# Patient Record
Sex: MALE | Race: OTHER | HISPANIC OR LATINO
[De-identification: names, ages, dates, MRNs, and addresses within clinical notes are randomized per-mention and may not be internally consistent; named-entity substitution may affect disease eponyms.]

---

## 2018-09-19 PROBLEM — Z00.00 ENCOUNTER FOR PREVENTIVE HEALTH EXAMINATION: Status: ACTIVE | Noted: 2018-09-19

## 2018-09-25 PROBLEM — Z01.818 PREOPERATIVE EXAMINATION: Status: ACTIVE | Noted: 2018-09-25

## 2018-09-28 ENCOUNTER — APPOINTMENT (OUTPATIENT)
Dept: SURGERY | Facility: CLINIC | Age: 28
End: 2018-09-28

## 2018-09-28 DIAGNOSIS — Z01.818 ENCOUNTER FOR OTHER PREPROCEDURAL EXAMINATION: ICD-10-CM

## 2018-10-15 ENCOUNTER — OTHER (OUTPATIENT)
Age: 28
End: 2018-10-15

## 2018-10-15 ENCOUNTER — APPOINTMENT (OUTPATIENT)
Dept: SURGERY | Facility: CLINIC | Age: 28
End: 2018-10-15

## 2019-01-16 ENCOUNTER — TRANSCRIPTION ENCOUNTER (OUTPATIENT)
Age: 29
End: 2019-01-16

## 2019-01-16 ENCOUNTER — APPOINTMENT (OUTPATIENT)
Dept: BARIATRICS | Facility: CLINIC | Age: 29
End: 2019-01-16
Payer: COMMERCIAL

## 2019-01-16 VITALS
TEMPERATURE: 96.7 F | BODY MASS INDEX: 42.81 KG/M2 | HEART RATE: 85 BPM | DIASTOLIC BLOOD PRESSURE: 84 MMHG | HEIGHT: 67.5 IN | SYSTOLIC BLOOD PRESSURE: 136 MMHG | OXYGEN SATURATION: 98 % | WEIGHT: 276 LBS

## 2019-01-16 DIAGNOSIS — Z78.9 OTHER SPECIFIED HEALTH STATUS: ICD-10-CM

## 2019-01-16 DIAGNOSIS — R53.83 OTHER FATIGUE: ICD-10-CM

## 2019-01-16 DIAGNOSIS — Z86.39 PERSONAL HISTORY OF OTHER ENDOCRINE, NUTRITIONAL AND METABOLIC DISEASE: ICD-10-CM

## 2019-01-16 DIAGNOSIS — R12 HEARTBURN: ICD-10-CM

## 2019-01-16 PROCEDURE — 99203 OFFICE O/P NEW LOW 30 MIN: CPT

## 2019-01-16 NOTE — PHYSICAL EXAM
[Obese, well nourished, in no acute distress] : obese, well nourished, in no acute distress [Normal] : affect appropriate [de-identified] : large neck size [de-identified] : central obesity

## 2019-01-16 NOTE — HISTORY OF PRESENT ILLNESS
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

## 2019-01-16 NOTE — ASSESSMENT
[FreeTextEntry1] : for lap vsg \par nothing to be gained by further insurance mandated diet\par There is nothing to be gained by waitng more time \par all questions answered \par over 30 minutes\par

## 2019-02-07 ENCOUNTER — APPOINTMENT (OUTPATIENT)
Dept: BARIATRICS | Facility: CLINIC | Age: 29
End: 2019-02-07
Payer: COMMERCIAL

## 2019-02-07 VITALS — HEIGHT: 67.5 IN | BODY MASS INDEX: 42.9 KG/M2 | WEIGHT: 276.56 LBS

## 2019-02-07 PROCEDURE — 97802 MEDICAL NUTRITION INDIV IN: CPT

## 2019-02-28 ENCOUNTER — TRANSCRIPTION ENCOUNTER (OUTPATIENT)
Age: 29
End: 2019-02-28

## 2019-03-19 ENCOUNTER — FORM ENCOUNTER (OUTPATIENT)
Age: 29
End: 2019-03-19

## 2019-03-20 ENCOUNTER — APPOINTMENT (OUTPATIENT)
Dept: BARIATRICS | Facility: CLINIC | Age: 29
End: 2019-03-20

## 2019-03-20 ENCOUNTER — OUTPATIENT (OUTPATIENT)
Dept: OUTPATIENT SERVICES | Facility: HOSPITAL | Age: 29
LOS: 1 days | End: 2019-03-20
Payer: COMMERCIAL

## 2019-03-20 ENCOUNTER — APPOINTMENT (OUTPATIENT)
Dept: BARIATRICS | Facility: CLINIC | Age: 29
End: 2019-03-20
Payer: COMMERCIAL

## 2019-03-20 VITALS
BODY MASS INDEX: 42.41 KG/M2 | HEART RATE: 87 BPM | SYSTOLIC BLOOD PRESSURE: 137 MMHG | OXYGEN SATURATION: 98 % | WEIGHT: 273.38 LBS | HEIGHT: 67.5 IN | DIASTOLIC BLOOD PRESSURE: 83 MMHG

## 2019-03-20 DIAGNOSIS — E66.01 MORBID (SEVERE) OBESITY DUE TO EXCESS CALORIES: ICD-10-CM

## 2019-03-20 PROCEDURE — 71046 X-RAY EXAM CHEST 2 VIEWS: CPT | Mod: 26

## 2019-03-20 PROCEDURE — 99213 OFFICE O/P EST LOW 20 MIN: CPT

## 2019-03-20 PROCEDURE — 71046 X-RAY EXAM CHEST 2 VIEWS: CPT

## 2019-03-20 NOTE — PHYSICAL EXAM
[Obese, well nourished, in no acute distress] : obese, well nourished, in no acute distress [Normal] : affect appropriate [de-identified] : central obesity [de-identified] : large neck size

## 2019-03-20 NOTE — HISTORY OF PRESENT ILLNESS
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

## 2019-03-21 ENCOUNTER — OUTPATIENT (OUTPATIENT)
Dept: OUTPATIENT SERVICES | Facility: HOSPITAL | Age: 29
LOS: 1 days | End: 2019-03-21
Payer: COMMERCIAL

## 2019-03-21 DIAGNOSIS — Z01.818 ENCOUNTER FOR OTHER PREPROCEDURAL EXAMINATION: ICD-10-CM

## 2019-03-21 PROCEDURE — 93005 ELECTROCARDIOGRAM TRACING: CPT

## 2019-03-21 PROCEDURE — 93010 ELECTROCARDIOGRAM REPORT: CPT | Mod: NC

## 2019-03-22 VITALS
HEIGHT: 68 IN | SYSTOLIC BLOOD PRESSURE: 122 MMHG | WEIGHT: 272.05 LBS | DIASTOLIC BLOOD PRESSURE: 58 MMHG | TEMPERATURE: 97 F | HEART RATE: 79 BPM | RESPIRATION RATE: 18 BRPM | OXYGEN SATURATION: 96 %

## 2019-03-25 ENCOUNTER — APPOINTMENT (OUTPATIENT)
Dept: BARIATRICS | Facility: HOSPITAL | Age: 29
End: 2019-03-25

## 2019-03-25 ENCOUNTER — RESULT REVIEW (OUTPATIENT)
Age: 29
End: 2019-03-25

## 2019-03-25 ENCOUNTER — INPATIENT (INPATIENT)
Facility: HOSPITAL | Age: 29
LOS: 0 days | Discharge: HOME | DRG: 621 | End: 2019-03-26
Attending: SURGERY | Admitting: SURGERY
Payer: COMMERCIAL

## 2019-03-25 DIAGNOSIS — Z98.890 OTHER SPECIFIED POSTPROCEDURAL STATES: Chronic | ICD-10-CM

## 2019-03-25 LAB
HCT VFR BLD CALC: 43.9 % — SIGNIFICANT CHANGE UP (ref 39–50)
HGB BLD-MCNC: 14.5 G/DL — SIGNIFICANT CHANGE UP (ref 13–17)
MCHC RBC-ENTMCNC: 31.3 PG — SIGNIFICANT CHANGE UP (ref 27–34)
MCHC RBC-ENTMCNC: 33 GM/DL — SIGNIFICANT CHANGE UP (ref 32–36)
MCV RBC AUTO: 94.6 FL — SIGNIFICANT CHANGE UP (ref 80–100)
NRBC # BLD: 0 /100 WBCS — SIGNIFICANT CHANGE UP (ref 0–0)
PLATELET # BLD AUTO: 216 K/UL — SIGNIFICANT CHANGE UP (ref 150–400)
RBC # BLD: 4.64 M/UL — SIGNIFICANT CHANGE UP (ref 4.2–5.8)
RBC # FLD: 12.9 % — SIGNIFICANT CHANGE UP (ref 10.3–14.5)
WBC # BLD: 11.06 K/UL — HIGH (ref 3.8–10.5)
WBC # FLD AUTO: 11.06 K/UL — HIGH (ref 3.8–10.5)

## 2019-03-25 PROCEDURE — 43775 LAP SLEEVE GASTRECTOMY: CPT

## 2019-03-25 RX ORDER — HYDROMORPHONE HYDROCHLORIDE 2 MG/ML
0.5 INJECTION INTRAMUSCULAR; INTRAVENOUS; SUBCUTANEOUS EVERY 4 HOURS
Qty: 0 | Refills: 0 | Status: DISCONTINUED | OUTPATIENT
Start: 2019-03-25 | End: 2019-03-26

## 2019-03-25 RX ORDER — OXYCODONE HYDROCHLORIDE 5 MG/1
5 TABLET ORAL EVERY 4 HOURS
Qty: 0 | Refills: 0 | Status: DISCONTINUED | OUTPATIENT
Start: 2019-03-25 | End: 2019-03-25

## 2019-03-25 RX ORDER — HYDROMORPHONE HYDROCHLORIDE 2 MG/ML
1 INJECTION INTRAMUSCULAR; INTRAVENOUS; SUBCUTANEOUS EVERY 4 HOURS
Qty: 0 | Refills: 0 | Status: DISCONTINUED | OUTPATIENT
Start: 2019-03-25 | End: 2019-03-26

## 2019-03-25 RX ORDER — HYDROMORPHONE HYDROCHLORIDE 2 MG/ML
0.5 INJECTION INTRAMUSCULAR; INTRAVENOUS; SUBCUTANEOUS
Qty: 0 | Refills: 0 | Status: DISCONTINUED | OUTPATIENT
Start: 2019-03-25 | End: 2019-03-25

## 2019-03-25 RX ORDER — ACETAMINOPHEN 500 MG
1000 TABLET ORAL ONCE
Qty: 0 | Refills: 0 | Status: DISCONTINUED | OUTPATIENT
Start: 2019-03-25 | End: 2019-03-26

## 2019-03-25 RX ORDER — ONDANSETRON 8 MG/1
4 TABLET, FILM COATED ORAL EVERY 6 HOURS
Qty: 0 | Refills: 0 | Status: DISCONTINUED | OUTPATIENT
Start: 2019-03-25 | End: 2019-03-26

## 2019-03-25 RX ORDER — PANTOPRAZOLE SODIUM 20 MG/1
40 TABLET, DELAYED RELEASE ORAL DAILY
Qty: 0 | Refills: 0 | Status: DISCONTINUED | OUTPATIENT
Start: 2019-03-25 | End: 2019-03-26

## 2019-03-25 RX ORDER — SCOPALAMINE 1 MG/3D
1 PATCH, EXTENDED RELEASE TRANSDERMAL ONCE
Qty: 0 | Refills: 0 | Status: COMPLETED | OUTPATIENT
Start: 2019-03-25 | End: 2019-03-25

## 2019-03-25 RX ORDER — ACETAMINOPHEN 500 MG
1000 TABLET ORAL ONCE
Qty: 0 | Refills: 0 | Status: COMPLETED | OUTPATIENT
Start: 2019-03-25 | End: 2019-03-25

## 2019-03-25 RX ORDER — DOCUSATE SODIUM 100 MG
100 CAPSULE ORAL THREE TIMES A DAY
Qty: 0 | Refills: 0 | Status: DISCONTINUED | OUTPATIENT
Start: 2019-03-25 | End: 2019-03-25

## 2019-03-25 RX ORDER — GABAPENTIN 400 MG/1
300 CAPSULE ORAL ONCE
Qty: 0 | Refills: 0 | Status: COMPLETED | OUTPATIENT
Start: 2019-03-25 | End: 2019-03-25

## 2019-03-25 RX ORDER — METOCLOPRAMIDE HCL 10 MG
10 TABLET ORAL EVERY 6 HOURS
Qty: 0 | Refills: 0 | Status: DISCONTINUED | OUTPATIENT
Start: 2019-03-25 | End: 2019-03-26

## 2019-03-25 RX ORDER — SODIUM CHLORIDE 9 MG/ML
1000 INJECTION, SOLUTION INTRAVENOUS
Qty: 0 | Refills: 0 | Status: DISCONTINUED | OUTPATIENT
Start: 2019-03-25 | End: 2019-03-26

## 2019-03-25 RX ORDER — ENOXAPARIN SODIUM 100 MG/ML
40 INJECTION SUBCUTANEOUS ONCE
Qty: 0 | Refills: 0 | Status: COMPLETED | OUTPATIENT
Start: 2019-03-25 | End: 2019-03-25

## 2019-03-25 RX ORDER — KETOROLAC TROMETHAMINE 30 MG/ML
30 SYRINGE (ML) INJECTION EVERY 8 HOURS
Qty: 0 | Refills: 0 | Status: DISCONTINUED | OUTPATIENT
Start: 2019-03-25 | End: 2019-03-26

## 2019-03-25 RX ORDER — ACETAMINOPHEN 500 MG
650 TABLET ORAL EVERY 6 HOURS
Qty: 0 | Refills: 0 | Status: DISCONTINUED | OUTPATIENT
Start: 2019-03-25 | End: 2019-03-25

## 2019-03-25 RX ADMIN — HYDROMORPHONE HYDROCHLORIDE 0.5 MILLIGRAM(S): 2 INJECTION INTRAMUSCULAR; INTRAVENOUS; SUBCUTANEOUS at 16:20

## 2019-03-25 RX ADMIN — Medication 30 MILLIGRAM(S): at 21:45

## 2019-03-25 RX ADMIN — Medication 30 MILLIGRAM(S): at 21:26

## 2019-03-25 RX ADMIN — ENOXAPARIN SODIUM 40 MILLIGRAM(S): 100 INJECTION SUBCUTANEOUS at 09:12

## 2019-03-25 RX ADMIN — Medication 1000 MILLIGRAM(S): at 09:12

## 2019-03-25 RX ADMIN — SCOPALAMINE 1 PATCH: 1 PATCH, EXTENDED RELEASE TRANSDERMAL at 09:12

## 2019-03-25 RX ADMIN — PANTOPRAZOLE SODIUM 40 MILLIGRAM(S): 20 TABLET, DELAYED RELEASE ORAL at 17:22

## 2019-03-25 RX ADMIN — ONDANSETRON 4 MILLIGRAM(S): 8 TABLET, FILM COATED ORAL at 14:02

## 2019-03-25 RX ADMIN — GABAPENTIN 300 MILLIGRAM(S): 400 CAPSULE ORAL at 09:13

## 2019-03-25 RX ADMIN — SCOPALAMINE 1 PATCH: 1 PATCH, EXTENDED RELEASE TRANSDERMAL at 18:23

## 2019-03-25 RX ADMIN — HYDROMORPHONE HYDROCHLORIDE 0.5 MILLIGRAM(S): 2 INJECTION INTRAMUSCULAR; INTRAVENOUS; SUBCUTANEOUS at 13:55

## 2019-03-25 RX ADMIN — Medication 30 MILLIGRAM(S): at 14:00

## 2019-03-25 RX ADMIN — Medication 1 TABLET(S): at 17:19

## 2019-03-25 NOTE — H&P PST ADULT - NSICDXPASTMEDICALHX_GEN_ALL_CORE_FT
PAST MEDICAL HISTORY:  GERD (gastroesophageal reflux disease)     Migraine headache     Morbid obesity     Sleep apnea

## 2019-03-25 NOTE — BRIEF OPERATIVE NOTE - OPERATION/FINDINGS
Laparoscopic sleeve gastrectomy. Sleeve created over a 36Fr bougie. Small arteriolar bleeding from splenic hilum after dissection of the greater curvature. Controlled with pressure and surgiflow, then figure-of-8 PDS suture. Greater omentopexy to staple line. Hemostasis achieved at the end of the case. Spleen was pink and viable.

## 2019-03-25 NOTE — H&P PST ADULT - ASSESSMENT
28M with a hx of GABI, MO (BMI 41) here for an elective LSG.    - Will admit to surgical service post op, regional bed, team 2  - BCLD, LR@150  - Pain/nausea control PRN  - SCDs/OOBA/IS  - PPI

## 2019-03-25 NOTE — PROGRESS NOTE ADULT - SUBJECTIVE AND OBJECTIVE BOX
POST-OPERATIVE NOTE    Procedure: Laparoscopic sleeve gastrectomy    Diagnosis/Indication: Morbid obesity     Surgeon: Dr. Bruce    S: Pt has no complaints. Denies CP, SOB, OBRIEN, calf tenderness. Pain controlled with medication.    O:  T(C): 36.9 (03-25-19 @ 13:12), Max: 36.9 (03-25-19 @ 13:12)  T(F): 98.5 (03-25-19 @ 13:12), Max: 98.5 (03-25-19 @ 13:12)  HR: 74 (03-25-19 @ 14:52) (74 - 106)  BP: 141/80 (03-25-19 @ 14:52) (120/99 - 150/80)  RR: 16 (03-25-19 @ 14:52) (12 - 21)  SpO2: 97% (03-25-19 @ 14:52) (96% - 100%)  Wt(kg): --            Gen: NAD, resting comfortably in bed  C/V: NSR  Pulm: Nonlabored breathing, no respiratory distress  Abd:  Soft, non- distended, TTP around incision site, incision clean dry and intact   Extrem: WWP, no calf edema, SCDs in place

## 2019-03-25 NOTE — PROGRESS NOTE ADULT - ASSESSMENT
28M with a hx of MO (BMI 41), GABI (not on CPAP at home), presenting for elective bariatric surgery.Pt s/p Laparoscopic sleeve gastrectomy 3/25  -Pain/nausea control  -BCLD, IVF  -Protonix  -CBC 6hrs post-op   -Eliquis POD3 x30d  -SCDs, OOB/A, IS  -AM labs  -Nutritional consult in AM

## 2019-03-25 NOTE — H&P PST ADULT - HISTORY OF PRESENT ILLNESS
28M with a hx of MO 28M with a hx of MO (BMI 41), GABI (not on CPAP at home), presenting to [facility] having failed significant weight loss with diet and exercise alone. Now here for an elective laparoscopic sleeve gastrectomy.    PMH: MO, GABI  Meds: occasional omeprazole  NKDA  PSH: No prior intraabdominal surgery  Social: Never smoker, social ETOH

## 2019-03-26 ENCOUNTER — TRANSCRIPTION ENCOUNTER (OUTPATIENT)
Age: 29
End: 2019-03-26

## 2019-03-26 VITALS
OXYGEN SATURATION: 99 % | RESPIRATION RATE: 18 BRPM | DIASTOLIC BLOOD PRESSURE: 70 MMHG | HEART RATE: 72 BPM | TEMPERATURE: 98 F | SYSTOLIC BLOOD PRESSURE: 113 MMHG

## 2019-03-26 LAB
ANION GAP SERPL CALC-SCNC: 14 MMOL/L — SIGNIFICANT CHANGE UP (ref 5–17)
BLD GP AB SCN SERPL QL: NEGATIVE — SIGNIFICANT CHANGE UP
BUN SERPL-MCNC: 10 MG/DL — SIGNIFICANT CHANGE UP (ref 7–23)
CALCIUM SERPL-MCNC: 9.4 MG/DL — SIGNIFICANT CHANGE UP (ref 8.4–10.5)
CHLORIDE SERPL-SCNC: 104 MMOL/L — SIGNIFICANT CHANGE UP (ref 96–108)
CO2 SERPL-SCNC: 24 MMOL/L — SIGNIFICANT CHANGE UP (ref 22–31)
CREAT SERPL-MCNC: 0.96 MG/DL — SIGNIFICANT CHANGE UP (ref 0.5–1.3)
GLUCOSE SERPL-MCNC: 111 MG/DL — HIGH (ref 70–99)
HCT VFR BLD CALC: 40 % — SIGNIFICANT CHANGE UP (ref 39–50)
HGB BLD-MCNC: 13.2 G/DL — SIGNIFICANT CHANGE UP (ref 13–17)
MAGNESIUM SERPL-MCNC: 2 MG/DL — SIGNIFICANT CHANGE UP (ref 1.6–2.6)
MCHC RBC-ENTMCNC: 31.5 PG — SIGNIFICANT CHANGE UP (ref 27–34)
MCHC RBC-ENTMCNC: 33 GM/DL — SIGNIFICANT CHANGE UP (ref 32–36)
MCV RBC AUTO: 95.5 FL — SIGNIFICANT CHANGE UP (ref 80–100)
NRBC # BLD: 0 /100 WBCS — SIGNIFICANT CHANGE UP (ref 0–0)
PHOSPHATE SERPL-MCNC: 4.2 MG/DL — SIGNIFICANT CHANGE UP (ref 2.5–4.5)
PLATELET # BLD AUTO: 201 K/UL — SIGNIFICANT CHANGE UP (ref 150–400)
POTASSIUM SERPL-MCNC: 3.9 MMOL/L — SIGNIFICANT CHANGE UP (ref 3.5–5.3)
POTASSIUM SERPL-SCNC: 3.9 MMOL/L — SIGNIFICANT CHANGE UP (ref 3.5–5.3)
RBC # BLD: 4.19 M/UL — LOW (ref 4.2–5.8)
RBC # FLD: 13.1 % — SIGNIFICANT CHANGE UP (ref 10.3–14.5)
RH IG SCN BLD-IMP: POSITIVE — SIGNIFICANT CHANGE UP
SODIUM SERPL-SCNC: 142 MMOL/L — SIGNIFICANT CHANGE UP (ref 135–145)
SURGICAL PATHOLOGY STUDY: SIGNIFICANT CHANGE UP
WBC # BLD: 8.33 K/UL — SIGNIFICANT CHANGE UP (ref 3.8–10.5)
WBC # FLD AUTO: 8.33 K/UL — SIGNIFICANT CHANGE UP (ref 3.8–10.5)

## 2019-03-26 RX ORDER — OMEPRAZOLE 10 MG/1
1 CAPSULE, DELAYED RELEASE ORAL
Qty: 30 | Refills: 0 | OUTPATIENT
Start: 2019-03-26 | End: 2019-04-24

## 2019-03-26 RX ORDER — SODIUM CHLORIDE 9 MG/ML
1000 INJECTION, SOLUTION INTRAVENOUS
Qty: 0 | Refills: 0 | Status: DISCONTINUED | OUTPATIENT
Start: 2019-03-26 | End: 2019-03-26

## 2019-03-26 RX ORDER — ACETAMINOPHEN 500 MG
650 TABLET ORAL EVERY 6 HOURS
Qty: 0 | Refills: 0 | Status: DISCONTINUED | OUTPATIENT
Start: 2019-03-26 | End: 2019-03-26

## 2019-03-26 RX ORDER — APIXABAN 2.5 MG/1
1 TABLET, FILM COATED ORAL
Qty: 60 | Refills: 0 | OUTPATIENT
Start: 2019-03-26 | End: 2019-04-24

## 2019-03-26 RX ORDER — KETOROLAC TROMETHAMINE 30 MG/ML
30 SYRINGE (ML) INJECTION EVERY 6 HOURS
Qty: 0 | Refills: 0 | Status: DISCONTINUED | OUTPATIENT
Start: 2019-03-26 | End: 2019-03-26

## 2019-03-26 RX ADMIN — HYDROMORPHONE HYDROCHLORIDE 1 MILLIGRAM(S): 2 INJECTION INTRAMUSCULAR; INTRAVENOUS; SUBCUTANEOUS at 03:40

## 2019-03-26 RX ADMIN — Medication 30 MILLIGRAM(S): at 05:31

## 2019-03-26 RX ADMIN — Medication 30 MILLIGRAM(S): at 11:35

## 2019-03-26 RX ADMIN — Medication 1 TABLET(S): at 11:05

## 2019-03-26 RX ADMIN — SODIUM CHLORIDE 80 MILLILITER(S): 9 INJECTION, SOLUTION INTRAVENOUS at 09:00

## 2019-03-26 RX ADMIN — PANTOPRAZOLE SODIUM 40 MILLIGRAM(S): 20 TABLET, DELAYED RELEASE ORAL at 11:05

## 2019-03-26 RX ADMIN — Medication 30 MILLIGRAM(S): at 05:45

## 2019-03-26 RX ADMIN — HYDROMORPHONE HYDROCHLORIDE 1 MILLIGRAM(S): 2 INJECTION INTRAMUSCULAR; INTRAVENOUS; SUBCUTANEOUS at 03:25

## 2019-03-26 RX ADMIN — Medication 30 MILLIGRAM(S): at 11:05

## 2019-03-26 NOTE — PROGRESS NOTE ADULT - ASSESSMENT
28M with a hx of MO (BMI 41), GABI (not on CPAP at home), presenting for elective bariatric surgery.Pt s/p Laparoscopic sleeve gastrectomy 3/25    -Pain/nausea control  -BCLD, IVF  -Protonix  -Eliquis POD3 x30d  -SCDs, OOB/A, IS  -AM labs  -Nutritional consult in AM 28M with a hx of MO (BMI 41), GABI (not on CPAP at home), presenting for elective bariatric surgery.Pt s/p Laparoscopic sleeve gastrectomy 3/25    -Pain/nausea control  -BCLD, IVF  -Protonix  -Eliquis POD3 x30d  -SCDs, OOB/A, IS  -AM labs  -Nutritional consult in AM  Patient examined bedside and plan discussed with Dr. Bruce.

## 2019-03-26 NOTE — DISCHARGE NOTE PROVIDER - NSDCACTIVITY_GEN_ALL_CORE
No heavy lifting/straining/Walking - Outdoors allowed/Showering allowed/Stairs allowed/Walking - Indoors allowed/Do not drive or operate machinery

## 2019-03-26 NOTE — DISCHARGE NOTE PROVIDER - NSDCFUADDINST_GEN_ALL_CORE_FT
Follow up with Dr. Bruce in 1 week. Call the office at  to schedule your appointment. You may shower; soap and water over incision sites. Do not scrub. Pat dry when done. No tub bathing or swimming until cleared. Keep incision sites out of the sun as scars will darken. No heavy lifting (>10lbs) or strenuous exercise. Diet: Bariatric Full Fluids. 60 grams protein daily.  64 fluid ounces water daily. Drink small sips throughout the day. Continue diet as outlined by paperwork received as a pre-operative patient. You should be urinating at least 3-4x per day. Call the office if you experience increasing abdominal pain, nausea, vomiting, or temperature >100.4F.  NO ASPIRIN OR NSAIDs until approved by Dr. Bruce. Avoid alcoholic beverages until cleared by Dr. Bruce.

## 2019-03-26 NOTE — DISCHARGE NOTE PROVIDER - NSDCCPGOAL_GEN_ALL_CORE_FT
To get better and follow your care plan as instructed.    1)  Please take Tylenol 650 mg every 4 to 6 hours by mouth for moderate pain control.   2) Please take Percocet 1 to 2 tabs by mouth every 4 to 6 hours as needed for severe pain control.  3) Please start taking Eliquis Thursday March 28, 2019 2.5 mg twice a day by mouth for 30 days.   4) Please take Omeprazole 40 mg once a day by mouth.

## 2019-03-26 NOTE — DIETITIAN INITIAL EVALUATION ADULT. - ENERGY NEEDS
Height: 5'8" Weight: 272lbs, IBW 154lbs+/-10%, %%, BMI 41.4  IBW used for calculations as pt >120% of IBW   Above energy needs calculated for wt maintenance (20-25kcal/kg).   Weeks 1-2 estimated needs: 698-838kcal/day (10-12kcal/kg), 105-147g pro/day (1.5-2.1g/kg), >/=64oz clear fluids.

## 2019-03-26 NOTE — DISCHARGE NOTE PROVIDER - CARE PROVIDER_API CALL
Sanford Bruce (MD)  Surgery  [address] [place] [address], [place] [address]  Phone: [phone]  Fax: [phone]  Follow Up Time: 1 week

## 2019-03-26 NOTE — DIETITIAN INITIAL EVALUATION ADULT. - OTHER INFO
27yo M s/p lap sleeve gastrectomy POD1. Pt seen in room, resting in bed, w/ wife at bedside. Currently on a BARICLLIQ diet and tolerating PO. Adequate fluid inatke, consuming >4oz/hr. Nausea well controlled. Experiencing gas pains. Prepared w/ protein and vitamin supplements. RD provided indepth edu on diet advancement and specific nutrient needs s/p LSG. Pt receptive and expressed understanding. NKFA or dietary restrictions. Skin: surgical incision; GI WDL per flowsheet.

## 2019-03-26 NOTE — DISCHARGE NOTE PROVIDER - HOSPITAL COURSE
28M with a hx of MO (BMI 41), GABI (not on CPAP at home), presenting to [facility] having failed significant weight loss with diet and exercise alone. Now here for an elective laparoscopic sleeve gastrectomy. Patient s/p laparoscopic  sleeve gastrectomy      Pt tolerated the procedure well.  At time of discharge, pt was tolerating a bariatric clear liquid diet, and pt's pain was controlled. Plan is to follow up with Dr. Bruce  in the office.

## 2019-03-26 NOTE — DISCHARGE NOTE NURSING/CASE MANAGEMENT/SOCIAL WORK - NSDCDPATPORTLINK_GEN_ALL_CORE
You can access the [facility] Patient Portal, offered by [facility], by registering with the following website: [email or web address]

## 2019-03-27 PROCEDURE — 36415 COLL VENOUS BLD VENIPUNCTURE: CPT

## 2019-03-27 PROCEDURE — 86900 BLOOD TYPING SEROLOGIC ABO: CPT

## 2019-03-27 PROCEDURE — 86850 RBC ANTIBODY SCREEN: CPT

## 2019-03-27 PROCEDURE — 85027 COMPLETE CBC AUTOMATED: CPT

## 2019-03-27 PROCEDURE — 88307 TISSUE EXAM BY PATHOLOGIST: CPT

## 2019-03-27 PROCEDURE — 83735 ASSAY OF MAGNESIUM: CPT

## 2019-03-27 PROCEDURE — 80048 BASIC METABOLIC PNL TOTAL CA: CPT

## 2019-03-27 PROCEDURE — C1889: CPT

## 2019-03-27 PROCEDURE — 86901 BLOOD TYPING SEROLOGIC RH(D): CPT

## 2019-03-27 PROCEDURE — 84100 ASSAY OF PHOSPHORUS: CPT

## 2019-03-28 DIAGNOSIS — E66.01 MORBID (SEVERE) OBESITY DUE TO EXCESS CALORIES: ICD-10-CM

## 2019-03-28 DIAGNOSIS — G43.909 MIGRAINE, UNSPECIFIED, NOT INTRACTABLE, WITHOUT STATUS MIGRAINOSUS: ICD-10-CM

## 2019-03-28 DIAGNOSIS — G47.33 OBSTRUCTIVE SLEEP APNEA (ADULT) (PEDIATRIC): ICD-10-CM

## 2019-03-28 DIAGNOSIS — K21.9 GASTRO-ESOPHAGEAL REFLUX DISEASE WITHOUT ESOPHAGITIS: ICD-10-CM

## 2019-03-28 PROBLEM — G47.30 SLEEP APNEA, UNSPECIFIED: Chronic | Status: ACTIVE | Noted: 2019-03-22

## 2019-03-29 ENCOUNTER — OTHER (OUTPATIENT)
Age: 29
End: 2019-03-29

## 2019-04-03 ENCOUNTER — APPOINTMENT (OUTPATIENT)
Dept: BARIATRICS | Facility: CLINIC | Age: 29
End: 2019-04-03
Payer: COMMERCIAL

## 2019-04-03 VITALS
SYSTOLIC BLOOD PRESSURE: 110 MMHG | HEART RATE: 98 BPM | BODY MASS INDEX: 39.77 KG/M2 | WEIGHT: 256.38 LBS | DIASTOLIC BLOOD PRESSURE: 78 MMHG | HEIGHT: 67.5 IN | OXYGEN SATURATION: 98 % | TEMPERATURE: 97.1 F

## 2019-04-03 DIAGNOSIS — Z09 ENCOUNTER FOR FOLLOW-UP EXAMINATION AFTER COMPLETED TREATMENT FOR CONDITIONS OTHER THAN MALIGNANT NEOPLASM: ICD-10-CM

## 2019-04-03 PROCEDURE — 99024 POSTOP FOLLOW-UP VISIT: CPT

## 2019-04-03 NOTE — HISTORY OF PRESENT ILLNESS
[de-identified] : 29 yo male about 1 wk s/p lap vsg, on stage 2 diet.  Patient is getting at least 60 grams of protein and 64 oz of fluids.  Walking every hour.  had one dififcult bm butnow normal and soft.  Denies fever, chills, n/v, cp, sob, abd pain, leg pain, lightheadedness, dizziness, calf pain, HA, heartburn

## 2019-04-03 NOTE — PLAN
[FreeTextEntry1] : f/u 5 weeks or sooner if any issues\par increase hydration \par can advance to stage 3 in one wk

## 2019-05-07 ENCOUNTER — APPOINTMENT (OUTPATIENT)
Dept: BARIATRICS | Facility: CLINIC | Age: 29
End: 2019-05-07

## 2020-02-20 ENCOUNTER — RECORD ABSTRACTING (OUTPATIENT)
Age: 30
End: 2020-02-20

## 2020-06-09 ENCOUNTER — OUTPATIENT (OUTPATIENT)
Dept: OUTPATIENT SERVICES | Facility: HOSPITAL | Age: 30
LOS: 1 days | End: 2020-06-09

## 2020-06-09 DIAGNOSIS — Z98.890 OTHER SPECIFIED POSTPROCEDURAL STATES: Chronic | ICD-10-CM

## 2020-06-10 LAB — SARS-COV-2 RNA SPEC QL NAA+PROBE: SIGNIFICANT CHANGE UP

## 2021-10-14 NOTE — PRE-OP CHECKLIST - WARM FLUIDS/WARM BLANKETS
Chief Complaint   Patient presents with     Derm Problem     Elizabeth is here for a skin check, said she has no new areas of concern.     Gabriel Nathan, Paramedic     no

## 2023-04-03 NOTE — PATIENT PROFILE ADULT - NSPROMEDSPATCH_GEN_A_NUR
Deanna/[employer] calling to notify Dr. Dejuan Byrd that prior-authorization needs to be started for [facility]. Deanna states she started it online and will send us a fax with instructions for us to complete it.
none

## 2023-06-11 ENCOUNTER — EMERGENCY (EMERGENCY)
Facility: HOSPITAL | Age: 33
LOS: 1 days | Discharge: HOME | End: 2023-06-11
Attending: EMERGENCY MEDICINE | Admitting: EMERGENCY MEDICINE
Payer: COMMERCIAL

## 2023-06-11 VITALS
RESPIRATION RATE: 18 BRPM | TEMPERATURE: 98 F | HEART RATE: 88 BPM | DIASTOLIC BLOOD PRESSURE: 86 MMHG | SYSTOLIC BLOOD PRESSURE: 138 MMHG | OXYGEN SATURATION: 100 %

## 2023-06-11 VITALS
HEART RATE: 94 BPM | RESPIRATION RATE: 18 BRPM | SYSTOLIC BLOOD PRESSURE: 118 MMHG | OXYGEN SATURATION: 100 % | DIASTOLIC BLOOD PRESSURE: 81 MMHG | TEMPERATURE: 98 F

## 2023-06-11 DIAGNOSIS — Z98.890 OTHER SPECIFIED POSTPROCEDURAL STATES: Chronic | ICD-10-CM

## 2023-06-11 LAB
ALBUMIN SERPL ELPH-MCNC: 4.9 G/DL — SIGNIFICANT CHANGE UP (ref 3.3–5)
ALP SERPL-CCNC: 70 U/L — SIGNIFICANT CHANGE UP (ref 40–120)
ALT FLD-CCNC: 40 U/L — SIGNIFICANT CHANGE UP (ref 4–41)
ANION GAP SERPL CALC-SCNC: 14 MMOL/L — SIGNIFICANT CHANGE UP (ref 7–14)
AST SERPL-CCNC: 27 U/L — SIGNIFICANT CHANGE UP (ref 4–40)
BASOPHILS # BLD AUTO: 0.04 K/UL — SIGNIFICANT CHANGE UP (ref 0–0.2)
BASOPHILS NFR BLD AUTO: 0.3 % — SIGNIFICANT CHANGE UP (ref 0–2)
BILIRUB SERPL-MCNC: 0.4 MG/DL — SIGNIFICANT CHANGE UP (ref 0.2–1.2)
BUN SERPL-MCNC: 16 MG/DL — SIGNIFICANT CHANGE UP (ref 7–23)
CALCIUM SERPL-MCNC: 9.6 MG/DL — SIGNIFICANT CHANGE UP (ref 8.4–10.5)
CHLORIDE SERPL-SCNC: 104 MMOL/L — SIGNIFICANT CHANGE UP (ref 98–107)
CO2 SERPL-SCNC: 23 MMOL/L — SIGNIFICANT CHANGE UP (ref 22–31)
CREAT SERPL-MCNC: 1.17 MG/DL — SIGNIFICANT CHANGE UP (ref 0.5–1.3)
EGFR: 85 ML/MIN/1.73M2 — SIGNIFICANT CHANGE UP
EOSINOPHIL # BLD AUTO: 0.08 K/UL — SIGNIFICANT CHANGE UP (ref 0–0.5)
EOSINOPHIL NFR BLD AUTO: 0.7 % — SIGNIFICANT CHANGE UP (ref 0–6)
GLUCOSE SERPL-MCNC: 100 MG/DL — HIGH (ref 70–99)
HCT VFR BLD CALC: 42.8 % — SIGNIFICANT CHANGE UP (ref 39–50)
HGB BLD-MCNC: 14.6 G/DL — SIGNIFICANT CHANGE UP (ref 13–17)
IANC: 8.83 K/UL — HIGH (ref 1.8–7.4)
IMM GRANULOCYTES NFR BLD AUTO: 0.3 % — SIGNIFICANT CHANGE UP (ref 0–0.9)
LYMPHOCYTES # BLD AUTO: 18 % — SIGNIFICANT CHANGE UP (ref 13–44)
LYMPHOCYTES # BLD AUTO: 2.08 K/UL — SIGNIFICANT CHANGE UP (ref 1–3.3)
MCHC RBC-ENTMCNC: 31 PG — SIGNIFICANT CHANGE UP (ref 27–34)
MCHC RBC-ENTMCNC: 34.1 GM/DL — SIGNIFICANT CHANGE UP (ref 32–36)
MCV RBC AUTO: 90.9 FL — SIGNIFICANT CHANGE UP (ref 80–100)
MONOCYTES # BLD AUTO: 0.48 K/UL — SIGNIFICANT CHANGE UP (ref 0–0.9)
MONOCYTES NFR BLD AUTO: 4.2 % — SIGNIFICANT CHANGE UP (ref 2–14)
NEUTROPHILS # BLD AUTO: 8.83 K/UL — HIGH (ref 1.8–7.4)
NEUTROPHILS NFR BLD AUTO: 76.5 % — SIGNIFICANT CHANGE UP (ref 43–77)
NRBC # BLD: 0 /100 WBCS — SIGNIFICANT CHANGE UP (ref 0–0)
NRBC # FLD: 0 K/UL — SIGNIFICANT CHANGE UP (ref 0–0)
PLATELET # BLD AUTO: 241 K/UL — SIGNIFICANT CHANGE UP (ref 150–400)
POTASSIUM SERPL-MCNC: 3.9 MMOL/L — SIGNIFICANT CHANGE UP (ref 3.5–5.3)
POTASSIUM SERPL-SCNC: 3.9 MMOL/L — SIGNIFICANT CHANGE UP (ref 3.5–5.3)
PROT SERPL-MCNC: 7.5 G/DL — SIGNIFICANT CHANGE UP (ref 6–8.3)
RBC # BLD: 4.71 M/UL — SIGNIFICANT CHANGE UP (ref 4.2–5.8)
RBC # FLD: 13.4 % — SIGNIFICANT CHANGE UP (ref 10.3–14.5)
SODIUM SERPL-SCNC: 141 MMOL/L — SIGNIFICANT CHANGE UP (ref 135–145)
TROPONIN T, HIGH SENSITIVITY RESULT: <6 NG/L — SIGNIFICANT CHANGE UP
WBC # BLD: 11.54 K/UL — HIGH (ref 3.8–10.5)
WBC # FLD AUTO: 11.54 K/UL — HIGH (ref 3.8–10.5)

## 2023-06-11 PROCEDURE — 99285 EMERGENCY DEPT VISIT HI MDM: CPT

## 2023-06-11 PROCEDURE — 93010 ELECTROCARDIOGRAM REPORT: CPT

## 2023-06-11 PROCEDURE — 71045 X-RAY EXAM CHEST 1 VIEW: CPT | Mod: 26

## 2023-06-11 RX ORDER — SODIUM CHLORIDE 9 MG/ML
1000 INJECTION INTRAMUSCULAR; INTRAVENOUS; SUBCUTANEOUS ONCE
Refills: 0 | Status: COMPLETED | OUTPATIENT
Start: 2023-06-11 | End: 2023-06-11

## 2023-06-11 RX ADMIN — SODIUM CHLORIDE 1000 MILLILITER(S): 9 INJECTION INTRAMUSCULAR; INTRAVENOUS; SUBCUTANEOUS at 15:49

## 2023-06-11 NOTE — ED PROVIDER NOTE - PROGRESS NOTE DETAILS
Alfonzo, PGY-3  Trop <6. Patient well appearing with no change in clinical status. Will d/c with return precautions and outpatient cardiology f/u

## 2023-06-11 NOTE — ED PROVIDER NOTE - OBJECTIVE STATEMENT
32 y/o male with past medical history of sleeve gastrectomy presenting with syncopal episode. Patient was taking CPAT/ exam with heavy strenuous exercise, wearing body vest weighted, felt acute onset of dizziness, weakness and fell to the floor with no head trauma.  No AC use.  Describes mild chest tightness, midsternal, nonradiating with no associated diaphoresis but intermittent palpitations.  Denies any recent fever/chills, nausea/vomiting/diarrhea, cough, rashes, or changes in urination.  No known history of cardiac disease. Took caffeinated pre-workout prior to exam.

## 2023-06-11 NOTE — ED ADULT NURSE NOTE - OBJECTIVE STATEMENT
Facilitator RN: Patient is a 33-year-old male, A&OX3, ambulatory denies any pertinent medical Hx presenting to the ED c/o palpitations. Pt says he was training for  physical CPAT and was on the stair master working out when he started to experience palpitations and chest tightness this morning. Pt says he noted heart rate in the 130's from watch. Respirations are even and unlabored, chest rise equal b/l. 18 G IV placed in L AC. Labs drawn and sent. Safety maintained.

## 2023-06-11 NOTE — ED PROVIDER NOTE - NSFOLLOWUPINSTRUCTIONS_ED_ALL_ED_FT
Syncope refers to a condition in which a person temporarily loses consciousness. Syncope may also be called fainting or passing out. It is caused by a sudden decrease in blood flow to the brain. Even though most causes of syncope are not dangerous, syncope can be a sign of a serious medical problem. Your health care provider may do tests to find the reason why you are having syncope.    Signs that you may be about to faint include:  Feeling dizzy or light-headed.Feeling nauseous. Seeing all white or all black in your field of vision. Having cold, clammy skin.   If you faint, get medical help right away. Call your local emergency services (301 in the [place] Do not drive yourself to the hospital.    Follow these instructions at home:  Pay attention to any changes in your symptoms.   Take these actions to stay safe and to help relieve your symptoms:  Do not drive, use machinery, or play sports until your health care provider says it is okay. Do not drink alcohol. Do not use any products that contain nicotine or tobacco, such as cigarettes and e-cigarettes. If you need help quitting, ask your health care provider. Drink enough fluid to keep your urine pale yellow.    General instructions   Take over-the-counter and prescription medicines only as told by your health care provider. If you are taking blood pressure or heart medicine, get up slowly and take several minutes to sit and then stand. This can reduce dizziness or light-headedness. Have someone stay with you until you feel stable. If you start to feel like you might faint, lie down right away and raise (elevate) your feet above the level of your heart. Breathe deeply and steadily. Wait until all the symptoms have passed. Keep all follow-up visits as told by your health care provider. This is important.     Get help right away if you:  Have a severe headache. Faint once or repeatedly. Have pain in your chest, abdomen, or back. Have a very fast or irregular heartbeat (palpitations). Have pain when you breathe. Are bleeding from your mouth or rectum, or you have black or tarry stool. Have a seizure. Are confused. Have trouble walking. Have severe weakness. Have vision problems. These symptoms may represent a serious problem that is an emergency. Do not wait to see if your symptoms will go away. Get medical help right away. Call your local emergency services (911 in the [place] Do not drive yourself to the hospital.     Summary  Syncope refers to a condition in which a person temporarily loses consciousness. It is caused by a sudden decrease in blood flow to the brain. Signs that you may be about to faint include dizziness, feeling light-headed, feeling nauseous, sudden vision changes, or cold, clammy skin. Although most causes of syncope are not dangerous, syncope can be a sign of a serious medical problem. If you faint, get medical help right away. This information is not intended to replace advice given to you by your health care provider. Make sure you discuss any questions you have with your health care provider.     You were seen today in the emergency room for chest pain. Although the testing done today indicates that your pain is not from an acute emergency, your pain could still represent a problem with your heart. You need to follow up with your doctor and/or a cardiologist in the next 48-72 hours. If you develop any new or worsening symptoms you need to return immediately to the emergency department. If you experience any of the following please come right back to the emergency room: chest pain that becomes much worse with walking up stairs or exercising, uncontrollable nausea and vomiting, severe chest pain that will not go away, passing out, new persistent numbness and/or weakness. If we discussed that this pain was likely due to a muscle strain or sprain you should take over the counter medications such as Tylenol. You should avoid taking medications such as ibuprofen, Motrin, Advil or other NSAIDs until you speak with your doctor about your pain.

## 2023-06-11 NOTE — ED ADULT NURSE NOTE - NSFALLUNIVINTERV_ED_ALL_ED
Bed/Stretcher in lowest position, wheels locked, appropriate side rails in place/Call bell, personal items and telephone in reach/Instruct patient to call for assistance before getting out of bed/chair/stretcher/Non-slip footwear applied when patient is off stretcher/[place] to call system/Physically safe environment - no spills, clutter or unnecessary equipment/Purposeful proactive rounding/Room/bathroom lighting operational, light cord in reach

## 2023-06-11 NOTE — ED PROVIDER NOTE - CLINICAL SUMMARY MEDICAL DECISION MAKING FREE TEXT BOX
Bayron: Patient was taking  test and passed out. No trauma. would evaluate for syncope. If all neg would give cards referral. EKG ok

## 2023-06-11 NOTE — ED PROVIDER NOTE - PATIENT PORTAL LINK FT
You can access the FollowMyHealth Patient Portal offered by [facility] by registering at the following website: [email or web address]. By joining [employer]’s FollowMyHealth portal, you will also be able to view your health information using other applications (apps) compatible with our system.

## 2023-06-11 NOTE — ED PROVIDER NOTE - PHYSICAL EXAMINATION
Gen: WDWN, NAD, comfortable appearing, hemodynamically stable   HEENT: Atraumatic head, no nasal discharge, mucous membranes moist   CV: RRR, +S1/S2, no M/R/G, equal b/l radial pulses 2+  Resp: CTAB, no W/R/R, SPO2 >95% on RA, no increased WOB   GI: Abdomen soft non-distended, NTTP, no masses/organomegaly   MSK/Skin: No open wounds, no bruising, no LE edema/calf tenderness   Neuro: A&Ox4, moving all 4 extremities spontaneously, gross sensation intact in UE and LE BL  Psych: appropriate mood

## 2023-06-11 NOTE — ED PROVIDER NOTE - ATTENDING CONTRIBUTION TO CARE
I performed a history and physical exam of the patient and discussed their management with the resident and /or advanced care provider. I reviewed the resident and /or ACP's note and agree with the documented findings and plan of care. My medical decision making and observations are found above.  Lungs clear, abd soft, regular rythm

## 2023-06-11 NOTE — ED PROVIDER NOTE - NSFOLLOWUPCLINICS_GEN_ALL_ED_FT
Cardiology at [facility]  Cardiology  [address] [place] [address]  Phone: [phone]  Fax:     Cardiology at [facility]  Cardiology  [address] [place] [address]  Phone: [phone]  Fax:

## 2023-06-11 NOTE — ED ADULT TRIAGE NOTE - CHIEF COMPLAINT QUOTE
states" My heart is beating irregularly since 1030 this morning while I was doing CPAT training for   test " c/o irregular heart beat with chest tightness. states I was in the ambulance while doing the test and my heart rate was around 138 and my watch also says my heart rate is around 127 and I still feel it irregular states" My heart is beating irregularly with chest tightness since 1030 this morning while I was doing CPAT training for   test " c/o irregular heart beat with chest tightness. states I was in the ambulance while doing the test and my heart rate was around 138 and my watch also says my heart rate is around 127 and I still feel it irregular

## 2023-06-11 NOTE — ED ADULT NURSE NOTE - CHIEF COMPLAINT QUOTE
states" My heart is beating irregularly with chest tightness since 1030 this morning while I was doing CPAT training for   test " c/o irregular heart beat with chest tightness. states I was in the ambulance while doing the test and my heart rate was around 138 and my watch also says my heart rate is around 127 and I still feel it irregular

## 2023-11-03 ENCOUNTER — LABORATORY RESULT (OUTPATIENT)
Age: 33
End: 2023-11-03

## 2023-11-03 ENCOUNTER — APPOINTMENT (OUTPATIENT)
Dept: UROLOGY | Facility: CLINIC | Age: 33
End: 2023-11-03
Payer: COMMERCIAL

## 2023-11-03 VITALS
BODY MASS INDEX: 40.43 KG/M2 | HEART RATE: 72 BPM | WEIGHT: 262 LBS | SYSTOLIC BLOOD PRESSURE: 135 MMHG | OXYGEN SATURATION: 99 % | RESPIRATION RATE: 18 BRPM | TEMPERATURE: 97.3 F | DIASTOLIC BLOOD PRESSURE: 83 MMHG

## 2023-11-03 DIAGNOSIS — N50.819 TESTICULAR PAIN, UNSPECIFIED: ICD-10-CM

## 2023-11-03 DIAGNOSIS — Z78.9 OTHER SPECIFIED HEALTH STATUS: ICD-10-CM

## 2023-11-03 DIAGNOSIS — N50.811 RIGHT TESTICULAR PAIN: ICD-10-CM

## 2023-11-03 DIAGNOSIS — G47.33 OBSTRUCTIVE SLEEP APNEA (ADULT) (PEDIATRIC): ICD-10-CM

## 2023-11-03 DIAGNOSIS — Z98.84 BARIATRIC SURGERY STATUS: ICD-10-CM

## 2023-11-03 PROCEDURE — 99204 OFFICE O/P NEW MOD 45 MIN: CPT

## 2023-11-04 ENCOUNTER — NON-APPOINTMENT (OUTPATIENT)
Age: 33
End: 2023-11-04

## 2023-11-04 LAB
AFP-TM SERPL-MCNC: 2.2 NG/ML
ANION GAP SERPL CALC-SCNC: 14 MMOL/L
BUN SERPL-MCNC: 14 MG/DL
CALCIUM SERPL-MCNC: 9.7 MG/DL
CHLORIDE SERPL-SCNC: 104 MMOL/L
CO2 SERPL-SCNC: 25 MMOL/L
CREAT SERPL-MCNC: 1.01 MG/DL
EGFR: 101 ML/MIN/1.73M2
GLUCOSE SERPL-MCNC: 91 MG/DL
HCG-TM SERPL-MCNC: <1 MIU/ML
LDH SERPL-CCNC: 141 U/L
POTASSIUM SERPL-SCNC: 4.8 MMOL/L
SODIUM SERPL-SCNC: 143 MMOL/L

## 2023-11-06 ENCOUNTER — NON-APPOINTMENT (OUTPATIENT)
Age: 33
End: 2023-11-06

## 2023-12-18 ENCOUNTER — APPOINTMENT (OUTPATIENT)
Dept: OPHTHALMOLOGY | Facility: CLINIC | Age: 33
End: 2023-12-18
Payer: COMMERCIAL

## 2023-12-18 ENCOUNTER — NON-APPOINTMENT (OUTPATIENT)
Age: 33
End: 2023-12-18

## 2023-12-18 PROCEDURE — 92025 CPTRIZED CORNEAL TOPOGRAPHY: CPT

## 2023-12-18 PROCEDURE — 92004 COMPRE OPH EXAM NEW PT 1/>: CPT

## 2024-09-25 ENCOUNTER — APPOINTMENT (OUTPATIENT)
Dept: SURGERY | Facility: CLINIC | Age: 34
End: 2024-09-25
Payer: COMMERCIAL

## 2024-09-25 VITALS — BODY MASS INDEX: 43.69 KG/M2 | HEIGHT: 69 IN | WEIGHT: 295 LBS

## 2024-09-25 DIAGNOSIS — Z98.84 BARIATRIC SURGERY STATUS: ICD-10-CM

## 2024-09-25 PROCEDURE — 99203 OFFICE O/P NEW LOW 30 MIN: CPT

## 2024-09-25 NOTE — HISTORY OF PRESENT ILLNESS
[Improved Health] : Improved health [Quality of Life] : Quality of life [Home] : at home, [unfilled] , at the time of the visit. [Medical Office: ([facility])___] : at the medical office located in  [Verbal consent obtained from patient] : the patient, [unfilled] [FreeTextEntry1] : Marcellus is a 34 year old male with a longstanding history of obesity presents for initial consultation for weight-loss management. Pt reports weight gain over her adult life, and he underwent sleeve gastrectomy in 2018 at a  [facility] (patient does not recall which hospital or the name of surgeon).  Patient denies acid reflux prior to or after sleeve gastrectomy, and reports he has no complaints regarding eating or with bowel movement after sleeve gastrectomy.  Patient reports before sleeve gastrectomy in 2018, his heaviest weight was 310 pounds, after sleeve he went down to 210 pounds.  Patient reports due to COVID-19, being at home and stress related to the pandemic, he began to over eat and decrease in daily physical activities.   Patient reports in October 2023, he consulted with another weight loss specialist who prescribed Saxenda.  Patient states he only used the Saxenda for about a month and a half, and stopped due to unavailability of the medication. Patient states due to difficulty communicating with his prior weight loss specialist, he discontinued following up with that provider.  Patient reports he is interested in medical management for weight loss.  Medical history is significant for prediabetes as per patient.  Surgical history is significant for sleeve gastrectomy in 2018 at [facility].   Weight Loss Medication Screening: Patient denies history of anxiety, substance abuse, uncontrolled blood pressure, pancreatitis. Patient denies family or personal history thyroid cancer or MEN 2 syndrome. History of bariatric surgery: Sleeve gastrectomy 2018 Obesity comorbidities: Pre-DM Comorbidities improved/resolved: n/a Anti-obesity medication tried: Saxenda Obesity medication side effects: n/a.  Weight at initial consultation: 295 pounds

## 2024-09-25 NOTE — ASSESSMENT
[FreeTextEntry1] : I had a lengthy discussion with the patient regarding nutrition, exercise, weight loss medications. Patient qualifies for and interested in weight loss medications.   I emphasized the importance of making healthier food choices including fresh fruits and vegetables, lean meats, and protein sources. I recommended front loading calories, incorporating whole grains, and eliminating fast foods. I also discussed the importance of avoiding fried/fatty foods and foods containing high sugar content including juices/shakes/sodas/desserts.   I also encouraged beginning an exercise program and recommended cardiovascular exercises along with strength training to build lean muscle. I made suggestions on different types of exercises to try.   Patient will work on the following: -Meet with nutritionist Dottie Carranza -Eliminate snacks -Focus on eating 3 well-balanced meals during the daytime with appropriate portion size -Cooking fresh meals rather than take out/processed/ready-made foods -Incorporating exercise; walk 8-10k steps daily -Obtain bloodwork     I have discussed initiating Zepbound therapy for pt. All risks and benefits have been discussed with the patient.   Currently there are no contraindications for the use of Zepbound after reviewing pts medical history and labs including personal or family history of thyroid cancer or MEN2. Possible side effects were discussed with the patient including nausea, reflux, constipation, delayed gastric emptying, or pancreatitis.   Pt verbalizes understanding and wishes to proceed with medical therapy. Start Zepbound 2.5mg  based on authorization and tolerance. Patient educated to call with questions/concerns. All questions answered.   Patient is advised to call after 3rd dose for possible dose escalation and make 2 month follow up appointment.  Patient verbalized understanding.  Doroteo Woody  Mark Bukr [facility]. [place], [place] Tel: [phone] Fax: [phone]

## 2024-09-25 NOTE — ASSESSMENT
[FreeTextEntry1] : I had a lengthy discussion with the patient regarding nutrition, exercise, weight loss medications. Patient qualifies for and interested in weight loss medications.   I emphasized the importance of making healthier food choices including fresh fruits and vegetables, lean meats, and protein sources. I recommended front loading calories, incorporating whole grains, and eliminating fast foods. I also discussed the importance of avoiding fried/fatty foods and foods containing high sugar content including juices/shakes/sodas/desserts.   I also encouraged beginning an exercise program and recommended cardiovascular exercises along with strength training to build lean muscle. I made suggestions on different types of exercises to try.   Patient will work on the following: -Meet with nutritionist Dottie Carranza -Eliminate snacks -Focus on eating 3 well-balanced meals during the daytime with appropriate portion size -Cooking fresh meals rather than take out/processed/ready-made foods -Incorporating exercise; walk 8-10k steps daily -Obtain bloodwork     I have discussed initiating Zepbound therapy for pt. All risks and benefits have been discussed with the patient.   Currently there are no contraindications for the use of Zepbound after reviewing pts medical history and labs including personal or family history of thyroid cancer or MEN2. Possible side effects were discussed with the patient including nausea, reflux, constipation, delayed gastric emptying, or pancreatitis.   Pt verbalizes understanding and wishes to proceed with medical therapy. Start Zepbound 2.5mg  based on authorization and tolerance. Patient educated to call with questions/concerns. All questions answered.   Patient is advised to call after 3rd dose for possible dose escalation and make 2 month follow up appointment.  Patient verbalized understanding.  Doroteo Woody  Mark Burk [facility]. [place], [place] Tel: [phone] Fax: [phone]

## 2024-09-25 NOTE — HISTORY OF PRESENT ILLNESS
[Improved Health] : Improved health [Quality of Life] : Quality of life [Home] : at home, [unfilled] , at the time of the visit. [Medical Office: ([facility])___] : at the medical office located in  [Verbal consent obtained from patient] : the patient, [unfilled] [FreeTextEntry1] : Marcellus is a 34 year old male with a longstanding history of obesity presents for initial consultation for weight-loss management. Pt reports weight gain over her adult life, and he underwent sleeve gastrectomy in 2018 at a  [facility] (patient does not recall which hospital or the name of surgeon).  Patient denies acid reflux prior to or after sleeve gastrectomy, and reports he has no complaints regarding eating or with bowel movement after sleeve gastrectomy.  Patient reports before sleeve gastrectomy in 2018, his heaviest weight was 310 pounds, after sleeve he went down to 210 pounds.  Patient reports due to COVID-19, being at home and stress related to the pandemic, he began to over eat and decrease in daily physical activities.   Patient reports in October 2023, he consulted with another weight loss specialist who prescribed Saxenda.  Patient states he only used the Saxenda for about a month and a half, and stopped due to unavailability of the medication. Patient states due to difficulty communicating with his prior weight loss specialist, he discontinued following up with that provider.  Patient reports he is interested in medical management for weight loss.  Medical history is significant for prediabetes as per patient.  Surgical history is significant for sleeve gastrectomy in 2018 at [facility].   Weight Loss Medication Screening: Patient denies history of anxiety, substance abuse, uncontrolled blood pressure, pancreatitis. Patient denies family or personal history thyroid cancer or MEN 2 syndrome. History of bariatric surgery: Sleeve gastrectomy 2018 Obesity comorbidities: Pre-DM Comorbidities improved/resolved: n/a Anti-obesity medication tried: Saxenda Obesity medication side effects: n/a.  Weight at initial consultation: 295 pounds

## 2024-09-25 NOTE — PHYSICAL EXAM
[de-identified] : Well-appearing 34-year-old male with obesity.  Physical examination limited due to telehealth.

## 2024-09-25 NOTE — PHYSICAL EXAM
[de-identified] : Well-appearing 34-year-old male with obesity.  Physical examination limited due to telehealth.

## 2024-09-27 RX ORDER — TIRZEPATIDE 2.5 MG/.5ML
2.5 INJECTION, SOLUTION SUBCUTANEOUS
Qty: 1 | Refills: 0 | Status: ACTIVE | COMMUNITY
Start: 2024-09-25

## 2024-09-30 ENCOUNTER — NON-APPOINTMENT (OUTPATIENT)
Age: 34
End: 2024-09-30

## 2024-09-30 DIAGNOSIS — R73.03 PREDIABETES.: ICD-10-CM

## 2024-09-30 LAB
25(OH)D3 SERPL-MCNC: 23 NG/ML
ALBUMIN SERPL ELPH-MCNC: 4.6 G/DL
ALP BLD-CCNC: 70 U/L
ALT SERPL-CCNC: 40 U/L
AMYLASE/CREAT SERPL: 77 U/L
ANION GAP SERPL CALC-SCNC: 14 MMOL/L
AST SERPL-CCNC: 19 U/L
BASOPHILS # BLD AUTO: 0.04 K/UL
BASOPHILS NFR BLD AUTO: 0.6 %
BILIRUB SERPL-MCNC: 0.6 MG/DL
BUN SERPL-MCNC: 14 MG/DL
CALCIUM SERPL-MCNC: 9.7 MG/DL
CHLORIDE SERPL-SCNC: 102 MMOL/L
CHOLEST SERPL-MCNC: 200 MG/DL
CO2 SERPL-SCNC: 24 MMOL/L
CREAT SERPL-MCNC: 1.01 MG/DL
EGFR: 101 ML/MIN/1.73M2
EOSINOPHIL # BLD AUTO: 0.14 K/UL
EOSINOPHIL NFR BLD AUTO: 2.2 %
ESTIMATED AVERAGE GLUCOSE: 120 MG/DL
FERRITIN SERPL-MCNC: 57 NG/ML
FOLATE SERPL-MCNC: 15.1 NG/ML
GLUCOSE SERPL-MCNC: 90 MG/DL
HBA1C MFR BLD HPLC: 5.8 %
HCT VFR BLD CALC: 45 %
HDLC SERPL-MCNC: 43 MG/DL
HGB BLD-MCNC: 15 G/DL
IMM GRANULOCYTES NFR BLD AUTO: 0.2 %
IRON SATN MFR SERPL: 37 %
IRON SERPL-MCNC: 139 UG/DL
LDLC SERPL CALC-MCNC: 118 MG/DL
LPL SERPL-CCNC: 57 U/L
LYMPHOCYTES # BLD AUTO: 1.83 K/UL
LYMPHOCYTES NFR BLD AUTO: 28.7 %
MAN DIFF?: NORMAL
MCHC RBC-ENTMCNC: 30.8 PG
MCHC RBC-ENTMCNC: 33.3 GM/DL
MCV RBC AUTO: 92.4 FL
MONOCYTES # BLD AUTO: 0.35 K/UL
MONOCYTES NFR BLD AUTO: 5.5 %
NEUTROPHILS # BLD AUTO: 4 K/UL
NEUTROPHILS NFR BLD AUTO: 62.8 %
NONHDLC SERPL-MCNC: 157 MG/DL
PLATELET # BLD AUTO: 231 K/UL
POTASSIUM SERPL-SCNC: 4.4 MMOL/L
PROT SERPL-MCNC: 7.1 G/DL
RBC # BLD: 4.87 M/UL
RBC # FLD: 13.3 %
SODIUM SERPL-SCNC: 141 MMOL/L
TIBC SERPL-MCNC: 380 UG/DL
TRIGL SERPL-MCNC: 220 MG/DL
UIBC SERPL-MCNC: 241 UG/DL
VIT B12 SERPL-MCNC: 339 PG/ML
WBC # FLD AUTO: 6.37 K/UL

## 2024-10-01 PROBLEM — R73.03 PRE-DIABETES: Status: ACTIVE | Noted: 2024-10-01

## 2024-10-02 LAB — VIT B1 SERPL-MCNC: 126.3 NMOL/L

## 2024-10-02 RX ORDER — PHENTERMINE HYDROCHLORIDE 15 MG/1
15 CAPSULE ORAL
Qty: 30 | Refills: 0 | Status: ACTIVE | COMMUNITY
Start: 2024-10-02 | End: 1900-01-01

## 2024-10-02 RX ORDER — TIRZEPATIDE 2.5 MG/.5ML
2.5 INJECTION, SOLUTION SUBCUTANEOUS
Qty: 2 | Refills: 0 | Status: ACTIVE | COMMUNITY
Start: 2024-10-01

## 2024-10-03 LAB — VIT B6 SERPL-MCNC: 8.7 UG/L

## 2024-10-10 ENCOUNTER — APPOINTMENT (OUTPATIENT)
Dept: SURGERY | Facility: CLINIC | Age: 34
End: 2024-10-10

## 2025-01-07 VITALS — HEIGHT: 69 IN | BODY MASS INDEX: 43.55 KG/M2 | WEIGHT: 294 LBS

## 2025-01-09 NOTE — PATIENT PROFILE ADULT - NSPROGENOTHERPROVIDER_GEN_A_NUR
No care due was identified.  Health [facility] Embedded Care Due Messages. Reference number: [id number].   1/09/2025 2:10:15 PM CST   none

## 2025-01-29 VITALS — HEIGHT: 69 IN | BODY MASS INDEX: 43.25 KG/M2 | WEIGHT: 292 LBS

## 2025-02-26 ENCOUNTER — APPOINTMENT (OUTPATIENT)
Dept: SURGERY | Facility: CLINIC | Age: 35
End: 2025-02-26
Payer: COMMERCIAL

## 2025-02-26 VITALS — BODY MASS INDEX: 41.71 KG/M2 | HEIGHT: 69 IN | WEIGHT: 281.6 LBS

## 2025-02-26 DIAGNOSIS — E66.01 MORBID (SEVERE) OBESITY DUE TO EXCESS CALORIES: ICD-10-CM

## 2025-02-26 PROCEDURE — 99213 OFFICE O/P EST LOW 20 MIN: CPT | Mod: 95

## 2025-02-27 ENCOUNTER — APPOINTMENT (OUTPATIENT)
Dept: INTERNAL MEDICINE | Facility: CLINIC | Age: 35
End: 2025-02-27

## 2025-02-27 ENCOUNTER — NON-APPOINTMENT (OUTPATIENT)
Age: 35
End: 2025-02-27

## 2025-02-27 ENCOUNTER — OUTPATIENT (OUTPATIENT)
Dept: OUTPATIENT SERVICES | Facility: HOSPITAL | Age: 35
LOS: 1 days | End: 2025-02-27

## 2025-02-27 DIAGNOSIS — Z98.890 OTHER SPECIFIED POSTPROCEDURAL STATES: Chronic | ICD-10-CM

## 2025-02-28 ENCOUNTER — APPOINTMENT (OUTPATIENT)
Dept: SURGERY | Facility: CLINIC | Age: 35
End: 2025-02-28

## 2025-03-19 NOTE — PATIENT PROFILE ADULT - PRO INTERPRETER NEED 2
Tylenol/ibuprofen as directed for pain  No sports through 3/23/25. Then activity as tolerated.     If worse go to the emergency department  
English

## 2025-03-26 ENCOUNTER — NON-APPOINTMENT (OUTPATIENT)
Age: 35
End: 2025-03-26

## 2025-03-27 ENCOUNTER — NON-APPOINTMENT (OUTPATIENT)
Age: 35
End: 2025-03-27

## 2025-03-27 ENCOUNTER — APPOINTMENT (OUTPATIENT)
Dept: NEUROLOGY | Facility: CLINIC | Age: 35
End: 2025-03-27
Payer: COMMERCIAL

## 2025-03-27 VITALS
WEIGHT: 281 LBS | BODY MASS INDEX: 41.62 KG/M2 | DIASTOLIC BLOOD PRESSURE: 82 MMHG | SYSTOLIC BLOOD PRESSURE: 118 MMHG | HEIGHT: 69 IN | HEART RATE: 87 BPM

## 2025-03-27 DIAGNOSIS — R06.83 SNORING: ICD-10-CM

## 2025-03-27 DIAGNOSIS — G43.709 CHRONIC MIGRAINE W/OUT AURA, NOT INTRACTABLE, W/OUT STATUS MIGRAINOSUS: ICD-10-CM

## 2025-03-27 PROCEDURE — 99204 OFFICE O/P NEW MOD 45 MIN: CPT

## 2025-03-27 RX ORDER — TOPIRAMATE 25 MG/1
25 TABLET, FILM COATED ORAL
Qty: 30 | Refills: 6 | Status: ACTIVE | COMMUNITY
Start: 2025-03-27 | End: 1900-01-01

## 2025-03-27 RX ORDER — RIZATRIPTAN BENZOATE 10 MG/1
10 TABLET ORAL
Qty: 9 | Refills: 6 | Status: ACTIVE | COMMUNITY
Start: 2025-03-27 | End: 1900-01-01

## 2025-04-30 DIAGNOSIS — E66.9 OBESITY, UNSPECIFIED: ICD-10-CM

## 2025-05-20 NOTE — ASSESSMENT
[FreeTextEntry1] : vss, tolerating po, active, walking every hour, no complaints  Attending Attestation (For Attendings USE Only)...

## 2025-05-22 ENCOUNTER — APPOINTMENT (OUTPATIENT)
Dept: SURGERY | Facility: CLINIC | Age: 35
End: 2025-05-22
Payer: COMMERCIAL

## 2025-05-22 VITALS — BODY MASS INDEX: 39.4 KG/M2 | WEIGHT: 266 LBS | HEIGHT: 69 IN

## 2025-05-22 DIAGNOSIS — E66.9 OBESITY, UNSPECIFIED: ICD-10-CM

## 2025-05-22 PROCEDURE — 99213 OFFICE O/P EST LOW 20 MIN: CPT | Mod: 95

## 2025-06-30 ENCOUNTER — APPOINTMENT (OUTPATIENT)
Dept: NEUROLOGY | Facility: CLINIC | Age: 35
End: 2025-06-30

## 2025-07-23 ENCOUNTER — APPOINTMENT (OUTPATIENT)
Dept: SURGERY | Facility: CLINIC | Age: 35
End: 2025-07-23
Payer: COMMERCIAL

## 2025-07-23 VITALS — BODY MASS INDEX: 37.44 KG/M2 | WEIGHT: 252.8 LBS | HEIGHT: 69 IN

## 2025-07-23 DIAGNOSIS — E66.9 OBESITY, UNSPECIFIED: ICD-10-CM

## 2025-07-23 PROCEDURE — 99213 OFFICE O/P EST LOW 20 MIN: CPT | Mod: 95

## 2025-07-28 RX ORDER — SEMAGLUTIDE 1.7 MG/.75ML
1.7 INJECTION, SOLUTION SUBCUTANEOUS
Qty: 1 | Refills: 0 | Status: ACTIVE | COMMUNITY
Start: 2025-07-28 | End: 1900-01-01